# Patient Record
Sex: MALE | Race: WHITE | NOT HISPANIC OR LATINO
[De-identification: names, ages, dates, MRNs, and addresses within clinical notes are randomized per-mention and may not be internally consistent; named-entity substitution may affect disease eponyms.]

---

## 2018-03-15 ENCOUNTER — APPOINTMENT (OUTPATIENT)
Dept: INTERNAL MEDICINE | Facility: CLINIC | Age: 23
End: 2018-03-15
Payer: COMMERCIAL

## 2018-03-15 VITALS
RESPIRATION RATE: 12 BRPM | SYSTOLIC BLOOD PRESSURE: 132 MMHG | WEIGHT: 164 LBS | BODY MASS INDEX: 29.06 KG/M2 | TEMPERATURE: 98.4 F | OXYGEN SATURATION: 100 % | HEART RATE: 69 BPM | DIASTOLIC BLOOD PRESSURE: 84 MMHG | HEIGHT: 63 IN

## 2018-03-15 DIAGNOSIS — H92.02 OTALGIA, LEFT EAR: ICD-10-CM

## 2018-03-15 DIAGNOSIS — R09.81 NASAL CONGESTION: ICD-10-CM

## 2018-03-15 DIAGNOSIS — J06.9 ACUTE UPPER RESPIRATORY INFECTION, UNSPECIFIED: ICD-10-CM

## 2018-03-15 PROCEDURE — 99203 OFFICE O/P NEW LOW 30 MIN: CPT

## 2019-05-28 ENCOUNTER — TRANSCRIPTION ENCOUNTER (OUTPATIENT)
Age: 24
End: 2019-05-28

## 2019-08-08 ENCOUNTER — APPOINTMENT (OUTPATIENT)
Dept: ORTHOPEDIC SURGERY | Facility: CLINIC | Age: 24
End: 2019-08-08
Payer: COMMERCIAL

## 2019-08-08 VITALS
HEIGHT: 67 IN | WEIGHT: 170 LBS | BODY MASS INDEX: 26.68 KG/M2 | SYSTOLIC BLOOD PRESSURE: 111 MMHG | HEART RATE: 74 BPM | DIASTOLIC BLOOD PRESSURE: 72 MMHG

## 2019-08-08 DIAGNOSIS — Z78.9 OTHER SPECIFIED HEALTH STATUS: ICD-10-CM

## 2019-08-08 DIAGNOSIS — S60.221A CONTUSION OF RIGHT HAND, INITIAL ENCOUNTER: ICD-10-CM

## 2019-08-08 PROCEDURE — 99204 OFFICE O/P NEW MOD 45 MIN: CPT

## 2019-08-08 PROCEDURE — 99214 OFFICE O/P EST MOD 30 MIN: CPT

## 2019-08-08 PROCEDURE — 73130 X-RAY EXAM OF HAND: CPT | Mod: RT

## 2019-08-08 NOTE — PHYSICAL EXAM
[de-identified] : Constitutional: Well-nourished, well-developed, No acute distress\par Respiratory:  Good respiratory effort, no SOB\par Lymphatic: No regional lymphadenopathy, no lymphedema\par Psychiatric: Pleasant and normal affect, alert and oriented x3\par Skin: Clean dry and intact B/L UE/LE\par Musculoskeletal: normal except where as noted in regional exam\par \par \par Left Hand:\par APPEARANCE: no marked deformities, no swelling or malalignment\par POSITIVE TENDERNESS: none\par NONTENDER: osseous and ligamentous structures. \par ROM: full & painless in CMC, MCP, and IP joints. \par RESISTIVE TESTING: painless resisted testing. \par SPECIAL TESTS: normal sensation of 1st through 5th digits, normal flex/ext, abd/add, and opposition of thumb, no triggering of fingers\par Vasc: 2+ radial pulse, normal capillary refill\par Neuro: AIN, PIN, Ulnar nerve intact to motor\par Sensation: Intact to light touch throughout\par B/L Shoulders:  No asymmetry, malalignment, or swelling, Full ROM, 5/5 strength in flexion/ext, Abd/Add, IR/ER, Joints stable\par B/L Elbows:  No asymmetry, malalignment, or swelling, Full ROM, 5/5 strength in flex/ext, pronation/supination, Joints stable\par B/L wrists: No asymmetry, malalignment, or swelling, Full ROM, 5/5 strength in wrist flexion/ext, and radial/ulnar deviation, Joints stable\par \par Right Hand:\par APPEARANCE: Mild swelling at the fifth digit MCP joint, no marked deformities or malalignment of the fingers\par POSITIVE TENDERNESS: Mild at fifth digit MCP joint\par NONTENDER: all other osseous and ligamentous structures. \par ROM: full & painless in CMC, MCP, and IP joints. \par RESISTIVE TESTING: painless resisted testing. \par SPECIAL TESTS: normal sensation of 1st through 5th digits, normal flex/ext, abd/add, and opposition of thumb, no triggering of fingers\par Vasc: 2+ radial pulse, normal capillary refill\par Neuro: AIN, PIN, Ulnar nerve intact to motor\par Sensation: Intact to light touch throughout\par  [de-identified] : \par The following radiographs were ordered and read by me during this patient's visit. I reviewed each radiograph in detail with the patient and discussed the findings as highlighted below. \par \par 3 views of the right hand were obtained today that show no fracture, or dislocation. There are no degenerative changes seen. There is no malalignment. No obvious osseous abnormality. Otherwise unremarkable.

## 2019-08-08 NOTE — DISCUSSION/SUMMARY
[de-identified] : Patient was seen today for evaluation of right hand pain status post contusion nearly 1 month ago. X-rays today is normal, no evidence of acute fracture, and no fracture with routine healing seen.  Patient had a contusion of the fifth metacarpal head with likely sprain of the MCP joint. He has near full range of motion and only mild pain at this time. 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 I advised the patient this pain may persist for upwards of 3 months, but as long as it is mild and intermittent he may continue physical activities as tolerated.  Follow up as needed.  Patient appreciates and agrees with current plan.\par \par This note was generated using dragon medical dictation software.  A reasonable effort has been made for proofreading its contents, but typos may still remain.  If there are any questions or points of clarification needed please notify my office.

## 2019-08-08 NOTE — HISTORY OF PRESENT ILLNESS
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  He injured his hand either 7/10/19 or 7/12/19 when he dropped something heavy and his hand was stuck between the object and the floor.  He is not taking any medications for the pain

## 2019-10-01 ENCOUNTER — APPOINTMENT (OUTPATIENT)
Dept: ORTHOPEDIC SURGERY | Facility: CLINIC | Age: 24
End: 2019-10-01
Payer: OTHER GOVERNMENT

## 2019-10-01 VITALS
HEIGHT: 67 IN | HEART RATE: 80 BPM | WEIGHT: 175 LBS | DIASTOLIC BLOOD PRESSURE: 77 MMHG | SYSTOLIC BLOOD PRESSURE: 121 MMHG | BODY MASS INDEX: 27.47 KG/M2

## 2019-10-01 DIAGNOSIS — G89.29 LOW BACK PAIN: ICD-10-CM

## 2019-10-01 DIAGNOSIS — M54.5 LOW BACK PAIN: ICD-10-CM

## 2019-10-01 PROCEDURE — 99215 OFFICE O/P EST HI 40 MIN: CPT

## 2019-10-01 PROCEDURE — 72100 X-RAY EXAM L-S SPINE 2/3 VWS: CPT

## 2019-10-01 RX ORDER — IBUPROFEN 800 MG/1
800 TABLET, FILM COATED ORAL
Qty: 90 | Refills: 0 | Status: ACTIVE | COMMUNITY
Start: 2019-10-01 | End: 1900-01-01

## 2019-10-01 NOTE — DISCUSSION/SUMMARY
[Medication Risks Reviewed] : Medication risks reviewed [de-identified] : I recommended rest and moist heat and increase the ibuprofen to 800 mg 3 times a day.  He will call if there are problems with the medication or worsening of his symptoms and I will see him for follow-up in 3 weeks.

## 2019-10-01 NOTE — HISTORY OF PRESENT ILLNESS
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  He has had lower back pain without associated leg pain.  He denies a history of prior back problems.  He has not had associated neurologic symptoms of numbness, paresthesias or weakness and there is no Valsalva effect.  He has had some night pain.  The pain is worse with standing and to a lesser degree with sitting.  Walking is comfortable.  Treatment to date has been stretching and ibuprofen 600 mg once or twice a day. [Pain Location] : pain [Stable] : stable [6] : a maximum pain level of 6/10 [Prolonged Sitting] : worsened by prolonged sitting [Prolonged Standing] : worsened by prolonged standing [Sitting] : not worsened by sitting [Standing] : worsened by standing [Walking] : not worsened by walking

## 2019-10-01 NOTE — PHYSICAL EXAM
[de-identified] : He is fully alert and oriented with a normal mood and affect.  He is in no acute distress.  He has a mesomorphic build.  He ambulates with a normal gait including tiptoe and heel walking.  There are no cutaneous abnormalities or palpable bony defects of the spine.  There is no evidence of shortness of breath or respiratory distress.  There is no paravertebral muscle spasm, sciatic notch tenderness or trochanteric tenderness.  Forward flexion of the spine shows the fingertips reaching to within 10 inches of the floor without pain.  There is mild discomfort with extension of the spine.  His lower extremity neurological examination revealed plus symmetrical reflexes with normal motor power and sensation.  Straight leg raising is negative to 90 degrees.  His hips and knees have a full painless range of motion with normal stability.  Pulses are intact and there is no lymphedema.  There are no cutaneous abnormalities of the upper or lower extremities.  His upper extremities are normal to inspection and his elbows have a full range of motion with normal motor power and stability. [de-identified] : AP and lateral x-rays of the lumbar spine reveal normal sagittal alignment.  Disc heights are well-maintained.  There are no destructive changes.  There is no evidence of a spondylolysis. [Physical Disability Temporary Partial] : temporarily partial disabled

## 2019-10-22 ENCOUNTER — APPOINTMENT (OUTPATIENT)
Dept: ORTHOPEDIC SURGERY | Facility: CLINIC | Age: 24
End: 2019-10-22
Payer: OTHER GOVERNMENT

## 2019-10-22 DIAGNOSIS — S39.012A STRAIN OF MUSCLE, FASCIA AND TENDON OF LOWER BACK, INITIAL ENCOUNTER: ICD-10-CM

## 2019-10-22 PROCEDURE — 99214 OFFICE O/P EST MOD 30 MIN: CPT

## 2019-10-22 NOTE — DISCUSSION/SUMMARY
[de-identified] : He can discontinue the ibuprofen today and then after for 5 days he can gradually return to activities.  Next week or exams and then he has a week off.  I will see him for follow-up on a as needed basis.

## 2020-12-16 PROBLEM — J06.9 ACUTE URI: Status: RESOLVED | Noted: 2018-03-15 | Resolved: 2020-12-16
